# Patient Record
Sex: FEMALE | Race: WHITE | NOT HISPANIC OR LATINO | Employment: OTHER | URBAN - METROPOLITAN AREA
[De-identification: names, ages, dates, MRNs, and addresses within clinical notes are randomized per-mention and may not be internally consistent; named-entity substitution may affect disease eponyms.]

---

## 2023-11-22 ENCOUNTER — APPOINTMENT (OUTPATIENT)
Dept: PAIN MEDICINE | Facility: CLINIC | Age: 73
End: 2023-11-22
Payer: MEDICARE

## 2023-11-29 PROBLEM — L60.8 NAIL DISCOLORATION: Status: ACTIVE | Noted: 2023-11-29

## 2023-11-29 PROBLEM — N89.8 VAGINAL ITCHING: Status: ACTIVE | Noted: 2023-11-29

## 2023-11-29 PROBLEM — M43.10 ACQUIRED SPONDYLOLISTHESIS: Status: ACTIVE | Noted: 2023-11-29

## 2023-11-29 PROBLEM — R10.9 ABDOMINAL PAIN: Status: ACTIVE | Noted: 2023-11-29

## 2023-11-29 PROBLEM — M54.16 CHRONIC LUMBAR RADICULOPATHY: Status: ACTIVE | Noted: 2023-11-29

## 2023-11-29 PROBLEM — B35.1 ONYCHOMYCOSIS: Status: ACTIVE | Noted: 2023-11-29

## 2023-11-29 PROBLEM — L85.3 XEROSIS OF SKIN: Status: ACTIVE | Noted: 2023-11-29

## 2023-11-29 PROBLEM — R60.0 EDEMA LEG: Status: ACTIVE | Noted: 2023-11-29

## 2023-11-29 PROBLEM — R10.2 FEMALE PELVIC PAIN: Status: ACTIVE | Noted: 2023-11-29

## 2023-11-29 PROBLEM — M76.70 PERONEAL TENDONITIS: Status: ACTIVE | Noted: 2023-11-29

## 2023-11-29 PROBLEM — E87.1 HYPONATREMIA: Status: ACTIVE | Noted: 2023-11-29

## 2023-11-29 PROBLEM — J32.9 SINUSITIS: Status: ACTIVE | Noted: 2023-11-29

## 2023-11-29 PROBLEM — G25.81 RESTLESS LEG SYNDROME: Status: ACTIVE | Noted: 2023-11-29

## 2023-11-29 PROBLEM — K21.9 GERD (GASTROESOPHAGEAL REFLUX DISEASE): Status: ACTIVE | Noted: 2023-11-29

## 2023-11-29 PROBLEM — M79.673 FOOT PAIN: Status: ACTIVE | Noted: 2023-11-29

## 2023-11-29 PROBLEM — E55.9 VITAMIN D DEFICIENCY, UNSPECIFIED: Status: ACTIVE | Noted: 2023-11-29

## 2023-11-29 PROBLEM — N39.0 ACUTE UTI: Status: ACTIVE | Noted: 2023-11-29

## 2023-11-29 PROBLEM — I10 HYPERTENSION: Status: ACTIVE | Noted: 2023-11-29

## 2023-11-29 PROBLEM — E78.5 DYSLIPIDEMIA: Status: ACTIVE | Noted: 2023-11-29

## 2023-11-29 PROBLEM — J02.9 PHARYNGITIS: Status: ACTIVE | Noted: 2023-11-29

## 2023-11-29 PROBLEM — R30.0 DYSURIA: Status: ACTIVE | Noted: 2023-11-29

## 2023-11-29 PROBLEM — L60.3 DYSTROPHIC NAIL: Status: ACTIVE | Noted: 2023-11-29

## 2023-11-29 PROBLEM — M54.50 LUMBAR SPINE PAIN: Status: ACTIVE | Noted: 2023-11-29

## 2023-11-29 RX ORDER — SIMVASTATIN 10 MG/1
1 TABLET, FILM COATED ORAL EVERY EVENING
COMMUNITY
Start: 2019-01-08

## 2023-11-29 RX ORDER — OMEPRAZOLE 40 MG/1
20 CAPSULE, DELAYED RELEASE ORAL DAILY
COMMUNITY
Start: 2018-08-14 | End: 2024-01-29 | Stop reason: ALTCHOICE

## 2023-11-29 RX ORDER — HYDROCHLOROTHIAZIDE 12.5 MG/1
1 TABLET ORAL
COMMUNITY
Start: 2017-08-31 | End: 2023-11-30 | Stop reason: ALTCHOICE

## 2023-11-29 RX ORDER — ATENOLOL 50 MG/1
1 TABLET ORAL 2 TIMES DAILY
COMMUNITY
Start: 2017-08-31

## 2023-11-29 RX ORDER — ACETAMINOPHEN 500 MG
TABLET ORAL
COMMUNITY
End: 2024-01-29 | Stop reason: ALTCHOICE

## 2023-11-29 RX ORDER — LISINOPRIL 40 MG/1
1 TABLET ORAL DAILY
COMMUNITY
Start: 2017-08-31

## 2023-11-29 RX ORDER — CHOLECALCIFEROL (VITAMIN D3) 25 MCG
1000 TABLET ORAL EVERY OTHER DAY
COMMUNITY
End: 2023-11-30 | Stop reason: ALTCHOICE

## 2023-11-29 RX ORDER — LANOLIN ALCOHOL/MO/W.PET/CERES
1 CREAM (GRAM) TOPICAL DAILY
COMMUNITY
End: 2024-01-29 | Stop reason: ALTCHOICE

## 2023-11-30 ENCOUNTER — OFFICE VISIT (OUTPATIENT)
Dept: PAIN MEDICINE | Facility: CLINIC | Age: 73
End: 2023-11-30
Payer: MEDICARE

## 2023-11-30 VITALS — TEMPERATURE: 97.9 F | HEART RATE: 67 BPM | HEIGHT: 67 IN | BODY MASS INDEX: 29.82 KG/M2 | WEIGHT: 190 LBS

## 2023-11-30 DIAGNOSIS — M54.50 LUMBAR SPINE PAIN: ICD-10-CM

## 2023-11-30 DIAGNOSIS — R29.898 RIGHT LEG WEAKNESS: ICD-10-CM

## 2023-11-30 DIAGNOSIS — M43.10 ACQUIRED SPONDYLOLISTHESIS: ICD-10-CM

## 2023-11-30 DIAGNOSIS — M54.16 CHRONIC LUMBAR RADICULOPATHY: Primary | ICD-10-CM

## 2023-11-30 PROCEDURE — 99214 OFFICE O/P EST MOD 30 MIN: CPT | Performed by: ANESTHESIOLOGY

## 2023-11-30 RX ORDER — DOXAZOSIN 1 MG/1
2 TABLET ORAL NIGHTLY
COMMUNITY

## 2023-11-30 SDOH — ECONOMIC STABILITY: FOOD INSECURITY: WITHIN THE PAST 12 MONTHS, YOU WORRIED THAT YOUR FOOD WOULD RUN OUT BEFORE YOU GOT MONEY TO BUY MORE.: NEVER TRUE

## 2023-11-30 SDOH — ECONOMIC STABILITY: FOOD INSECURITY: WITHIN THE PAST 12 MONTHS, THE FOOD YOU BOUGHT JUST DIDN'T LAST AND YOU DIDN'T HAVE MONEY TO GET MORE.: NEVER TRUE

## 2023-11-30 ASSESSMENT — PATIENT HEALTH QUESTIONNAIRE - PHQ9
2. FEELING DOWN, DEPRESSED OR HOPELESS: NOT AT ALL
SUM OF ALL RESPONSES TO PHQ9 QUESTIONS 1 AND 2: 0
1. LITTLE INTEREST OR PLEASURE IN DOING THINGS: NOT AT ALL

## 2023-11-30 ASSESSMENT — ENCOUNTER SYMPTOMS
DIARRHEA: 0
CONSTIPATION: 0
SHORTNESS OF BREATH: 0
NAUSEA: 0
NUMBNESS: 1
ARTHRALGIAS: 1
DIAPHORESIS: 0
BACK PAIN: 1
SPEECH DIFFICULTY: 0
WEAKNESS: 0
COUGH: 0
EYE DISCHARGE: 0
DIFFICULTY URINATING: 0
NECK STIFFNESS: 0
DIZZINESS: 0
VOMITING: 0
SEIZURES: 0
WHEEZING: 0
CHEST TIGHTNESS: 0
NECK PAIN: 0
BLOOD IN STOOL: 0
CHILLS: 0
FEVER: 0

## 2023-11-30 ASSESSMENT — LIFESTYLE VARIABLES
HOW MANY STANDARD DRINKS CONTAINING ALCOHOL DO YOU HAVE ON A TYPICAL DAY: PATIENT DOES NOT DRINK
SKIP TO QUESTIONS 9-10: 1
HOW OFTEN DO YOU HAVE SIX OR MORE DRINKS ON ONE OCCASION: NEVER
HOW OFTEN DO YOU HAVE A DRINK CONTAINING ALCOHOL: NEVER
AUDIT-C TOTAL SCORE: 0

## 2023-11-30 ASSESSMENT — COLUMBIA-SUICIDE SEVERITY RATING SCALE - C-SSRS
2. HAVE YOU ACTUALLY HAD ANY THOUGHTS OF KILLING YOURSELF?: NO
1. IN THE PAST MONTH, HAVE YOU WISHED YOU WERE DEAD OR WISHED YOU COULD GO TO SLEEP AND NOT WAKE UP?: NO
6. HAVE YOU EVER DONE ANYTHING, STARTED TO DO ANYTHING, OR PREPARED TO DO ANYTHING TO END YOUR LIFE?: NO

## 2023-11-30 NOTE — PROGRESS NOTES
Low back pain in the right sciatic and hip and down the leg.  History of right hip and knee pain.  Denies back and neck surgery

## 2023-11-30 NOTE — PROGRESS NOTES
History Of Present Illness  Juliana Dior is a 73 y.o. female presenting with   Chief Complaint   Patient presents with    Back Pain       Patient presents with complaints of chronic low back pain to the RIGHT HIP AND BUTTOCK anterolateral LE. The pain is constant, worse with activity and better with rest. The pain is sharp, stabbing and shooting to the RLE POSTERIORLY. Denies LE paresthesias, weakness, saddle anesthesia, bowel or bladder incontinence. To manage this pain the patient has attempted Tylenol, Aleve with some relief. The patient has undergone lumbar injections with [place]. Hobbs [place] [facility] [place] set of 4 injections. The patients chronic HTN are stable on medication management.     PAIN SCORE: 8/10.    Dr. Hobbs Patient, pt's daughter used to manage [facility]    PCP: Dr. David WHITNEY        Past Medical History  She has a past medical history of Encounter for screening for malignant neoplasm of vagina, Essential (primary) hypertension (07/31/2020), Hypo-osmolality and hyponatremia, Other specified postprocedural states, Personal history of other diseases of the digestive system, Personal history of other endocrine, nutritional and metabolic disease (07/12/2017), Personal history of other medical treatment, Personal history of other medical treatment, Personal history of other medical treatment, and Personal history of other medical treatment.    Surgical History  She has a past surgical history that includes Cataract extraction (02/24/2015); Hysterectomy (02/24/2015); Eye surgery (02/24/2015); Other surgical history (02/24/2015); Breast biopsy (12/18/2017); Appendectomy (12/18/2017); and XR knee (Right, 04/30/2021).     Social History  She reports that she has never smoked. She has never used smokeless tobacco. She reports that she does not drink alcohol and does not use drugs.    Family History  No family history on file.     Allergies  Patient has no known allergies.    Review of Systems    Constitutional:  Negative for chills, diaphoresis and fever.   HENT:  Negative for ear discharge and tinnitus.    Eyes:  Negative for discharge.   Respiratory:  Negative for cough, chest tightness, shortness of breath and wheezing.    Cardiovascular:  Negative for chest pain.   Gastrointestinal:  Negative for blood in stool, constipation, diarrhea, nausea and vomiting.   Endocrine: Negative for polyuria.   Genitourinary:  Negative for difficulty urinating.   Musculoskeletal:  Positive for arthralgias, back pain and gait problem. Negative for neck pain and neck stiffness.   Skin:  Negative for rash.   Neurological:  Positive for numbness. Negative for dizziness, seizures, speech difficulty and weakness.       All other systems reviewed and negative for any deficits. Pertinent positives and negatives were considered in the medical decision making process.        Physical Exam  Pulse 67   Temp 36.6 °C (97.9 °F)   Wt 86.2 kg (190 lb)     General: Pt appears stated age    Eyes: Conjunctiva non-icteric and lids without obvious rash or drooping. Pupils are symmetric    ENT: External ears and nose appear to be without deformity or rash. No lesions or masses noted. Hearing is grossly intact    Neck: No JVD noted, tracheal position midline. No goiter noted on assessment of thyroid    Respiratory: No gasping or shortness of breath noted, no use of accessory muscles noted    Cardiovascular: Extremities show no edema or varicosities    Skin: No rashes or open lesions/ulcers identified on skin. No induration/tightening noted with palpation of skin    Musculoskeletal: Gait is antalgic     Digits/nails show no clubbing or cyanosis    Exam of muscles/joints/bones shows no gross atrophy and no abnormal/involuntary movements in the head/neckNo asymmetry or masses noted in the head/neck    Stability: no subluxation noted on movement of bilateral upper extremities or head/neck    Strength: 3/5 in RLE and 5/5 LLE     Range of Motion:  "WNL     Sensation: DEC TO SHARP TOUCH ALONG L5 DERMATOME    Cranial nerves 2-12 are grossly intact    Psychiatric: Pt is alert and oriented to time, place and person.         Assessment/Plan   1. Chronic lumbar radiculopathy        2. Lumbar spine pain        3. Acquired spondylolisthesis               I have provided the patient with a list of physical therapy exercises to learn and perform to strengthen core, maintain stabilization, and reduce pain. We reviewed the exercises in detail and I encouraged them to perform them on a regular basis.    The pt has failed PT x 6 weeks at [facility] and has failed over 6 weeks of Med management with Aleve and Tylenol. Given her exam findings I would recommend a new MRI of her lumbar spine.     2. I extensively reviewed the patients x-ray findings (2023) in detail, including review of the actual images and provided a detailed explanation of the findings using a spine model.     \"08/13/2023 8:57 PM EDT   Imaging Result:    X-rays obtained AP lateral x-rays lumbar spine show evidence of grade 1  spondylolisthesis L5-S1 with degenerative changes.  No evidence of any hip  DJD. \"    [facility]  3.  The patient is a candidate for an LESI L5/S1 to treat low back and radicular pain. I spent time with the patient discussing all of the risks, benefits, and alternatives to this measure. Including but not limited to spinal infection, epidural hematoma/abscess, paralysis, nerve injury, steroid effects, and spinal headache. The patient understands and agrees to proceed as needed.     4. I would recommend the pt start on Gabapentin to help with nerve related pain. We discussed the risks, benefits, and side effects to this medication including the mechanism of action and the pt understands and will hold off for now.         Nirav Morgan MD    I spent time with the patient reviewing their imaging and discussing the risks benefits and alternatives to the above plan. A total of 45 minutes was spent " reviewing the data and greater than 50% of that time was with the patient during the face to face encounter discussing treatment options both surgical, non-surgical, and minimally invasive techniques.

## 2023-12-09 ENCOUNTER — LAB REQUISITION (OUTPATIENT)
Dept: LAB | Facility: HOSPITAL | Age: 73
End: 2023-12-09
Payer: MEDICARE

## 2023-12-09 DIAGNOSIS — R39.15 URGENCY OF URINATION: ICD-10-CM

## 2023-12-09 PROCEDURE — 87086 URINE CULTURE/COLONY COUNT: CPT

## 2023-12-11 LAB — BACTERIA UR CULT: NORMAL

## 2024-01-04 PROBLEM — H91.90 HOH (HARD OF HEARING): Status: ACTIVE | Noted: 2024-01-04

## 2024-01-04 PROBLEM — H61.23 IMPACTED CERUMEN, BILATERAL: Status: ACTIVE | Noted: 2023-11-02

## 2024-01-04 PROBLEM — G54.1 LUMBOSACRAL PLEXUS LESION: Status: ACTIVE | Noted: 2023-06-01

## 2024-01-04 PROBLEM — M47.26 OSTEOARTHRITIS OF SPINE WITH RADICULOPATHY, LUMBAR REGION: Status: ACTIVE | Noted: 2023-08-13

## 2024-01-04 PROBLEM — G61.9 INFLAMMATORY NEUROPATHY (MULTI): Status: ACTIVE | Noted: 2023-10-09

## 2024-01-04 PROBLEM — E78.5 HYPERLIPIDEMIA: Status: ACTIVE | Noted: 2020-06-08

## 2024-01-04 PROBLEM — R00.2 PALPITATIONS: Status: ACTIVE | Noted: 2020-06-09

## 2024-01-04 PROBLEM — E78.00 PURE HYPERCHOLESTEROLEMIA: Status: ACTIVE | Noted: 2020-06-09

## 2024-01-04 PROBLEM — R06.02 SOB (SHORTNESS OF BREATH): Status: ACTIVE | Noted: 2020-06-09

## 2024-01-04 PROBLEM — K21.9 GASTROESOPHAGEAL REFLUX DISEASE: Status: ACTIVE | Noted: 2020-06-08

## 2024-01-04 PROBLEM — I10 ESSENTIAL HYPERTENSION: Status: ACTIVE | Noted: 2020-06-08

## 2024-01-04 PROBLEM — M43.17 SPONDYLOLISTHESIS AT L5-S1 LEVEL: Status: ACTIVE | Noted: 2023-08-13

## 2024-01-04 RX ORDER — GENTAMICIN SULFATE 1 MG/G
OINTMENT TOPICAL
COMMUNITY
Start: 2023-10-04 | End: 2024-01-29 | Stop reason: ALTCHOICE

## 2024-01-04 RX ORDER — DOXAZOSIN 1 MG/1
TABLET ORAL
COMMUNITY
Start: 2021-04-12 | End: 2024-01-29 | Stop reason: ALTCHOICE

## 2024-01-04 RX ORDER — AMLODIPINE BESYLATE 5 MG/1
5 TABLET ORAL
COMMUNITY
Start: 2021-08-26 | End: 2024-01-29 | Stop reason: ALTCHOICE

## 2024-01-04 RX ORDER — CALCIUM CARBONATE 600 MG
TABLET ORAL
COMMUNITY
Start: 2021-04-12

## 2024-01-04 RX ORDER — OMEPRAZOLE 20 MG/1
CAPSULE, DELAYED RELEASE ORAL
COMMUNITY
Start: 2023-11-18

## 2024-01-04 RX ORDER — CALCIUM CARBONATE 260MG(650)
100 TABLET,CHEWABLE ORAL
COMMUNITY
Start: 2020-06-09

## 2024-01-04 RX ORDER — MULTIVITAMIN
TABLET ORAL
COMMUNITY
End: 2024-01-29 | Stop reason: ALTCHOICE

## 2024-01-04 RX ORDER — LOVASTATIN 10 MG/1
10 TABLET ORAL
COMMUNITY
End: 2024-01-29 | Stop reason: ALTCHOICE

## 2024-01-04 RX ORDER — METHYLPREDNISOLONE 4 MG/1
TABLET ORAL
COMMUNITY
Start: 2023-08-10 | End: 2024-01-29 | Stop reason: ALTCHOICE

## 2024-01-04 RX ORDER — METHOCARBAMOL 750 MG/1
TABLET, FILM COATED ORAL
COMMUNITY
Start: 2023-08-10 | End: 2024-01-29 | Stop reason: ALTCHOICE

## 2024-01-04 RX ORDER — FLUTICASONE PROPIONATE 50 UG/1
SPRAY, METERED NASAL
COMMUNITY
Start: 2021-04-12

## 2024-01-04 RX ORDER — CALCIUM CARBONATE 500(1250)
1250 TABLET ORAL 2 TIMES DAILY
COMMUNITY
Start: 2020-06-09 | End: 2024-01-29 | Stop reason: ALTCHOICE

## 2024-01-29 ENCOUNTER — OFFICE VISIT (OUTPATIENT)
Dept: PAIN MEDICINE | Facility: CLINIC | Age: 74
End: 2024-01-29
Payer: MEDICARE

## 2024-01-29 VITALS
WEIGHT: 190 LBS | SYSTOLIC BLOOD PRESSURE: 154 MMHG | BODY MASS INDEX: 29.76 KG/M2 | HEART RATE: 66 BPM | RESPIRATION RATE: 15 BRPM | DIASTOLIC BLOOD PRESSURE: 76 MMHG | TEMPERATURE: 95.7 F

## 2024-01-29 DIAGNOSIS — M54.16 LUMBAR NEURITIS: ICD-10-CM

## 2024-01-29 DIAGNOSIS — M43.17 SPONDYLOLISTHESIS AT L5-S1 LEVEL: Primary | ICD-10-CM

## 2024-01-29 DIAGNOSIS — M48.061 LUMBAR FORAMINAL STENOSIS: ICD-10-CM

## 2024-01-29 DIAGNOSIS — M54.50 LUMBAR SPINE PAIN: ICD-10-CM

## 2024-01-29 PROCEDURE — 99214 OFFICE O/P EST MOD 30 MIN: CPT | Performed by: ANESTHESIOLOGY

## 2024-01-29 RX ORDER — TROSPIUM CHLORIDE 20 MG/1
20 TABLET, FILM COATED ORAL 2 TIMES DAILY
COMMUNITY

## 2024-01-29 ASSESSMENT — ENCOUNTER SYMPTOMS
FEVER: 0
DIAPHORESIS: 0
BLOOD IN STOOL: 0
LOSS OF SENSATION IN FEET: 0
SEIZURES: 0
SHORTNESS OF BREATH: 0
DIZZINESS: 0
OCCASIONAL FEELINGS OF UNSTEADINESS: 0
WEAKNESS: 0
COUGH: 0
NAUSEA: 0
CONSTIPATION: 0
ARTHRALGIAS: 1
SPEECH DIFFICULTY: 0
NUMBNESS: 1
DIFFICULTY URINATING: 0
NECK PAIN: 0
CHILLS: 0
VOMITING: 0
BACK PAIN: 1
DIARRHEA: 0
DEPRESSION: 0
EYE DISCHARGE: 0
CHEST TIGHTNESS: 0
WHEEZING: 0
NECK STIFFNESS: 0

## 2024-01-29 ASSESSMENT — PAIN SCALES - GENERAL: PAINLEVEL: 5

## 2024-01-29 NOTE — PROGRESS NOTES
Right low back to the right hip to the back of the leg.  History of knee pain.  Denies back and neck surgery

## 2024-01-29 NOTE — PROGRESS NOTES
History Of Present Illness  Juliana Dior is a 73 y.o. female presenting with   Chief Complaint   Patient presents with    Back Pain       Patient follows up for slow return chronic low back pain to the RIGHT HIP AND BUTTOCK anterolateral LE. The pain is constant, worse with activity and better with rest. The pain is sharp, stabbing and shooting to the RLE POSTERIORLY. Denies LE paresthesias, weakness, saddle anesthesia, bowel or bladder incontinence. To manage this pain the patient has attempted Tylenol, Aleve with some relief. The patient has undergone lumbar injections with Dr. Hobbs at [facility] for a set of 4 injections. The patients chronic HTN are stable on medication management.     PAIN SCORE: 8/10.    Dr. Hobbs Patient, pt's daughter used to manage [facility] at [facility]    PCP: Dr. David WHITNEY        Past Medical History  She has a past medical history of Encounter for screening for malignant neoplasm of vagina, Essential (primary) hypertension (07/31/2020), Hypo-osmolality and hyponatremia, Other specified postprocedural states, Personal history of other diseases of the digestive system, Personal history of other endocrine, nutritional and metabolic disease (07/12/2017), Personal history of other medical treatment, Personal history of other medical treatment, Personal history of other medical treatment, and Personal history of other medical treatment.    Surgical History  She has a past surgical history that includes Cataract extraction (02/24/2015); Hysterectomy (02/24/2015); Eye surgery (02/24/2015); Other surgical history (02/24/2015); Breast biopsy (12/18/2017); Appendectomy (12/18/2017); and XR knee (Right, 04/30/2021).     Social History  She reports that she has never smoked. She has never used smokeless tobacco. She reports that she does not drink alcohol and does not use drugs.    Family History  No family history on file.     Allergies  Ibuprofen    Review of Systems   Constitutional:   Negative for chills, diaphoresis and fever.   HENT:  Negative for ear discharge and tinnitus.    Eyes:  Negative for discharge.   Respiratory:  Negative for cough, chest tightness, shortness of breath and wheezing.    Cardiovascular:  Negative for chest pain.   Gastrointestinal:  Negative for blood in stool, constipation, diarrhea, nausea and vomiting.   Endocrine: Negative for polyuria.   Genitourinary:  Negative for difficulty urinating.   Musculoskeletal:  Positive for arthralgias, back pain and gait problem. Negative for neck pain and neck stiffness.   Skin:  Negative for rash.   Neurological:  Positive for numbness. Negative for dizziness, seizures, speech difficulty and weakness.       All other systems reviewed and negative for any deficits. Pertinent positives and negatives were considered in the medical decision making process.        Physical Exam  /76   Pulse 66   Temp 35.4 °C (95.7 °F)   Resp 15   Wt 86.2 kg (190 lb)     General: Pt appears stated age    Eyes: Conjunctiva non-icteric and lids without obvious rash or drooping. Pupils are symmetric    ENT: External ears and nose appear to be without deformity or rash. No lesions or masses noted. Hearing is grossly intact    Neck: No JVD noted, tracheal position midline. No goiter noted on assessment of thyroid    Respiratory: No gasping or shortness of breath noted, no use of accessory muscles noted    Cardiovascular: Extremities show no edema or varicosities    Skin: No rashes or open lesions/ulcers identified on skin. No induration/tightening noted with palpation of skin    Musculoskeletal: Gait is antalgic     Digits/nails show no clubbing or cyanosis    Exam of muscles/joints/bones shows no gross atrophy and no abnormal/involuntary movements in the head/neckNo asymmetry or masses noted in the head/neck    Stability: no subluxation noted on movement of bilateral upper extremities or head/neck    Strength: 3/5 in RLE and 5/5 LLE     Range of  "Motion: WNL     Sensation: DEC TO SHARP TOUCH ALONG L5 DERMATOME    Cranial nerves 2-12 are grossly intact    Psychiatric: Pt is alert and oriented to time, place and person.         Assessment/Plan   1. Spondylolisthesis at L5-S1 level        2. Lumbar spine pain        3. Lumbar neuritis        4. Lumbar foraminal stenosis               I have provided the patient with a list of physical therapy exercises to learn and perform to strengthen core, maintain stabilization, and reduce pain. We reviewed the exercises in detail and I encouraged them to perform them on a regular basis.    The pt has failed PT x 6 weeks at [facility] and has failed over 6 weeks of Med management with Aleve and Tylenol. Given her exam findings I would recommend a new MRI of her lumbar spine.     2. I extensively reviewed the patients x-ray findings (2023) in detail, including review of the actual images and provided a detailed explanation of the findings using a spine model.     \"08/13/2023 8:57 PM EDT   Imaging Result:    X-rays obtained AP lateral x-rays lumbar spine show evidence of grade 1  spondylolisthesis L5-S1 with degenerative changes.  No evidence of any hip  DJD. \"    [facility]  3.  The patient is a candidate for an LESI L5/S1 to treat low back and radicular pain. I spent time with the patient discussing all of the risks, benefits, and alternatives to this measure. Including but not limited to spinal infection, epidural hematoma/abscess, paralysis, nerve injury, steroid effects, and spinal headache. The patient understands and agrees to proceed as needed.     Her last injection was on 12- and she reported 80% relief of her pain lasting for approx 1 months time. She was able to sleep for the first time since her pain started. Pt reports it has slowly returned and getting comfortable has become more painful for her.     4. I would recommend the pt start on Gabapentin to help with nerve related pain. We discussed the risks, benefits, " and side effects to this medication including the mechanism of action and the pt understands and will hold off for now.         Nirav Morgan MD    I spent time with the patient reviewing their imaging and discussing the risks benefits and alternatives to the above plan. A total of 45 minutes was spent reviewing the data and greater than 50% of that time was with the patient during the face to face encounter discussing treatment options both surgical, non-surgical, and minimally invasive techniques.        Hydroxychloroquine Counseling:  I discussed with the patient that a baseline ophthalmologic exam is needed at the start of therapy and every year thereafter while on therapy. A CBC may also be warranted for monitoring.  The side effects of this medication were discussed with the patient, including but not limited to agranulocytosis, aplastic anemia, seizures, rashes, retinopathy, and liver toxicity. Patient instructed to call the office should any adverse effect occur.  The patient verbalized understanding of the proper use and possible adverse effects of Plaquenil.  All the patient's questions and concerns were addressed.

## 2024-07-22 ENCOUNTER — TELEPHONE (OUTPATIENT)
Dept: PAIN MEDICINE | Facility: CLINIC | Age: 74
End: 2024-07-22
Payer: MEDICARE

## 2024-08-01 DIAGNOSIS — M54.16 LUMBAR NEURITIS: Primary | ICD-10-CM

## 2024-08-02 ENCOUNTER — APPOINTMENT (OUTPATIENT)
Dept: PAIN MEDICINE | Facility: CLINIC | Age: 74
End: 2024-08-02
Payer: MEDICARE

## 2025-02-06 ENCOUNTER — CLINICAL SUPPORT (OUTPATIENT)
Dept: URGENT CARE | Age: 75
End: 2025-02-06
Payer: MEDICARE

## 2025-02-06 VITALS
OXYGEN SATURATION: 99 % | DIASTOLIC BLOOD PRESSURE: 94 MMHG | HEART RATE: 66 BPM | SYSTOLIC BLOOD PRESSURE: 182 MMHG | RESPIRATION RATE: 15 BRPM | TEMPERATURE: 98.2 F

## 2025-02-06 DIAGNOSIS — T14.8XXA MUSCLE STRAIN: Primary | ICD-10-CM

## 2025-02-06 RX ORDER — METHYLPREDNISOLONE 4 MG/1
TABLET ORAL
Qty: 21 TABLET | Refills: 0 | Status: SHIPPED | OUTPATIENT
Start: 2025-02-06 | End: 2025-02-12

## 2025-02-06 RX ORDER — TIZANIDINE HYDROCHLORIDE 2 MG/1
2 CAPSULE, GELATIN COATED ORAL EVERY 8 HOURS PRN
Qty: 9 CAPSULE | Refills: 0 | Status: SHIPPED | OUTPATIENT
Start: 2025-02-06 | End: 2025-02-09

## 2025-02-06 ASSESSMENT — ENCOUNTER SYMPTOMS: MYALGIAS: 1

## 2025-02-06 NOTE — PROGRESS NOTES
Subjective   Patient ID: Juliana Dior is a 75 y.o. female. They present today with a chief complaint of Other (Pain under left shoulder blade X 2 days. JOE-MA).    History of Present Illness  HPI    Patient presents to the urgent care for a chief complaint of left-sided shoulder blade pain over the last 2 days.  Patient has used over-the-counter Salonpas patch which did seem to help, she denies any chest pain or shortness of breath.  Patient states that approximately 2 weeks ago did participate in a senior activities class did have some soreness in's similar area which did resolve, patient states yesterday while bowling developed left thoracic shoulder blade pain.  No report of any significant injury or specific inciting event patient does have a little full range of motion of shoulder  Past Medical History  Allergies as of 02/06/2025 - Reviewed 02/06/2025   Allergen Reaction Noted    Ibuprofen Other 03/17/2016       (Not in a hospital admission)       Past Medical History:   Diagnosis Date    Encounter for screening for malignant neoplasm of vagina     Vaginal Pap smear    Essential (primary) hypertension 07/31/2020    Hypertension    Hypo-osmolality and hyponatremia     Hyponatremia    Other specified postprocedural states     H/O colonoscopy    Personal history of other diseases of the digestive system     History of gastroesophageal reflux (GERD)    Personal history of other endocrine, nutritional and metabolic disease 07/12/2017    History of hyperlipidemia    Personal history of other medical treatment     H/O diagnostic mammography    Personal history of other medical treatment     History of bone density study    Personal history of other medical treatment     H/O diagnostic ultrasound    Personal history of other medical treatment     H/O mammogram       Past Surgical History:   Procedure Laterality Date    APPENDECTOMY  12/18/2017    Appendectomy    BREAST BIOPSY  12/18/2017    Biopsy Breast Open     CATARACT EXTRACTION  02/24/2015    Cataract Surgery    EYE SURGERY  02/24/2015    Eye Surgery    HYSTERECTOMY  02/24/2015    Hysterectomy    KNEE Right 04/30/2021    replaced    OTHER SURGICAL HISTORY  02/24/2015    Vaginal Sling Operation For Stress Incontinence        reports that she has never smoked. She has never used smokeless tobacco. She reports that she does not drink alcohol and does not use drugs.    Review of Systems  Review of Systems   Musculoskeletal:  Positive for myalgias.   All other systems reviewed and are negative.                                 Objective    Vitals:    02/06/25 0925   BP: (!) 182/94   Pulse: 66   Resp: 15   Temp: 36.8 °C (98.2 °F)   SpO2: 99%     No LMP recorded.    Physical Exam  Vitals and nursing note reviewed.   Constitutional:       General: She is not in acute distress.     Appearance: Normal appearance. She is not ill-appearing, toxic-appearing or diaphoretic.   Cardiovascular:      Rate and Rhythm: Normal rate.      Comments: Patient endorses whitecoat syndrome in regards to elevated BP  Pulmonary:      Effort: Pulmonary effort is normal. No respiratory distress.   Musculoskeletal:         General: Tenderness present. No swelling. Normal range of motion.      Comments: Upon examination of left upper back and shoulder no presence of gross deformity ecchymosis or edema, no pain with palpation over cervical thoracic spinous processes.,  Able to produce pain with palpation of left lateral paraspinal muscles of the thoracic region, range of motion of left shoulder is full, able to flex extend adduct and abduct, able to flex extend at elbow able to splay out fingers and make a fist.  Negative drop arm, negative Contreras negative empty cans   Skin:     Findings: No bruising or rash.   Neurological:      General: No focal deficit present.      Mental Status: She is alert and oriented to person, place, and time.   Psychiatric:         Mood and Affect: Mood normal.          Behavior: Behavior normal.         Procedures    Point of Care Test & Imaging Results from this visit  No results found for this visit on 02/06/25.   No results found.    Diagnostic study results (if any) were reviewed by [facility].    Assessment/Plan   Allergies, medications, history, and pertinent labs/EKGs/Imaging reviewed by Frederick Burgos PA-C.     Medical Decision Making  As able to reproduce pain with palpation and physical movements as was noted while patient was also putting on coat, no complaint of shortness of breath or chest pain I did discuss with patient I believe she has a thoracic paraspinal muscle strain patient will be placed on Medrol Dosepak May continue Salonpas, I did discuss with patient avoiding NSAIDs such as ibuprofen Advil Aleve or Motrin may take Tylenol.  Will also place patient on 2 mg 3-day course of tizanidine to help with symptoms.  May follow-up with primary care or orthopedics if no resolution regression of symptoms.  Patient verbalized understanding is agreeable to plan discharge emergent care A+O x 4 stable condition no signs of distress neurovascular intact    Orders and Diagnoses  Diagnoses and all orders for this visit:  Muscle strain  -     methylPREDNISolone (Medrol Dospak) 4 mg tablets; Take as directed on package.  -     tiZANidine (Zanaflex) 2 mg capsule; Take 1 capsule (2 mg) by mouth every 8 hours if needed for muscle spasms for up to 3 days.      Medical Admin Record      Patient disposition: Home    Electronically signed by [facility]  9:40 AM

## 2025-03-14 ENCOUNTER — TELEPHONE (OUTPATIENT)
Dept: PAIN MEDICINE | Facility: CLINIC | Age: 75
End: 2025-03-14
Payer: MEDICARE

## 2025-03-14 NOTE — TELEPHONE ENCOUNTER
Returned phone call.  Patient LVM requesting FUV with Dr. Morgan.  Left phone number for patient to call back so we can get her scheduled.

## 2025-04-10 ENCOUNTER — OFFICE VISIT (OUTPATIENT)
Dept: PAIN MEDICINE | Facility: CLINIC | Age: 75
End: 2025-04-10
Payer: MEDICARE

## 2025-04-10 VITALS
TEMPERATURE: 97.9 F | RESPIRATION RATE: 15 BRPM | WEIGHT: 190 LBS | HEART RATE: 66 BPM | BODY MASS INDEX: 29.82 KG/M2 | HEIGHT: 67 IN | OXYGEN SATURATION: 99 %

## 2025-04-10 DIAGNOSIS — M54.50 LUMBAR SPINE PAIN: Primary | ICD-10-CM

## 2025-04-10 DIAGNOSIS — M51.26 LUMBAR DISC HERNIATION: ICD-10-CM

## 2025-04-10 DIAGNOSIS — M54.16 LUMBAR NEURITIS: ICD-10-CM

## 2025-04-10 PROCEDURE — 99214 OFFICE O/P EST MOD 30 MIN: CPT | Performed by: ANESTHESIOLOGY

## 2025-04-10 RX ORDER — GABAPENTIN 300 MG/1
300 CAPSULE ORAL 2 TIMES DAILY
Qty: 180 CAPSULE | Refills: 3 | Status: SHIPPED | OUTPATIENT
Start: 2025-04-10 | End: 2025-07-09

## 2025-04-10 ASSESSMENT — ENCOUNTER SYMPTOMS
WHEEZING: 0
NECK PAIN: 0
VOMITING: 0
CONSTIPATION: 0
BLOOD IN STOOL: 0
NECK STIFFNESS: 0
WEAKNESS: 0
NAUSEA: 0
EYE DISCHARGE: 0
SHORTNESS OF BREATH: 0
DIFFICULTY URINATING: 0
SPEECH DIFFICULTY: 0
CHILLS: 0
DIZZINESS: 0
ARTHRALGIAS: 1
FEVER: 0
DIAPHORESIS: 0
LOSS OF SENSATION IN FEET: 0
DIARRHEA: 0
NUMBNESS: 1
SEIZURES: 0
BACK PAIN: 1
OCCASIONAL FEELINGS OF UNSTEADINESS: 0
COUGH: 0
CHEST TIGHTNESS: 0

## 2025-04-10 ASSESSMENT — PAIN DESCRIPTION - DESCRIPTORS: DESCRIPTORS: THROBBING

## 2025-04-10 ASSESSMENT — COLUMBIA-SUICIDE SEVERITY RATING SCALE - C-SSRS
6. HAVE YOU EVER DONE ANYTHING, STARTED TO DO ANYTHING, OR PREPARED TO DO ANYTHING TO END YOUR LIFE?: NO
2. HAVE YOU ACTUALLY HAD ANY THOUGHTS OF KILLING YOURSELF?: NO
1. IN THE PAST MONTH, HAVE YOU WISHED YOU WERE DEAD OR WISHED YOU COULD GO TO SLEEP AND NOT WAKE UP?: NO

## 2025-04-10 ASSESSMENT — PATIENT HEALTH QUESTIONNAIRE - PHQ9
2. FEELING DOWN, DEPRESSED OR HOPELESS: NOT AT ALL
1. LITTLE INTEREST OR PLEASURE IN DOING THINGS: NOT AT ALL
SUM OF ALL RESPONSES TO PHQ9 QUESTIONS 1 AND 2: 0

## 2025-04-10 ASSESSMENT — PAIN SCALES - GENERAL
PAINLEVEL_OUTOF10: 3
PAINLEVEL_OUTOF10: 3

## 2025-04-10 ASSESSMENT — PAIN - FUNCTIONAL ASSESSMENT: PAIN_FUNCTIONAL_ASSESSMENT: 0-10

## 2025-04-10 NOTE — PROGRESS NOTES
History Of Present Illness  Juliana Dior is a 75 y.o. female presenting with   Chief Complaint   Patient presents with    Follow-up       Patient follows up for slow return chronic low back pain to the RIGHT HIP AND BUTTOCK anterolateral LE. The pain is constant, worse with activity and better with rest. The pain is sharp, stabbing and shooting to the RLE POSTERIORLY. Denies LE paresthesias, weakness, saddle anesthesia, bowel or bladder incontinence. To manage this pain the patient has attempted Tylenol, Aleve with some relief. The patient has undergone lumbar injections with Dr. Hobbs at [facility] [place] for a set of 4 injections. The patients chronic HTN are stable on medication management.     PAIN SCORE: 8/10.    Dr. Hobbs Patient, pt's daughter used to manage [facility] [place] at [facility]    PCP: Dr. David WHITNEY        Past Medical History  She has a past medical history of Encounter for screening for malignant neoplasm of vagina, Essential (primary) hypertension (07/31/2020), Hypo-osmolality and hyponatremia, Other specified postprocedural states, Personal history of other diseases of the digestive system, Personal history of other endocrine, nutritional and metabolic disease (07/12/2017), Personal history of other medical treatment, Personal history of other medical treatment, Personal history of other medical treatment, and Personal history of other medical treatment.    Surgical History  She has a past surgical history that includes Cataract extraction (02/24/2015); Hysterectomy (02/24/2015); Eye surgery (02/24/2015); Other surgical history (02/24/2015); Breast biopsy (12/18/2017); Appendectomy (12/18/2017); and XR knee (Right, 04/30/2021).     Social History  She reports that she has never smoked. She has never used smokeless tobacco. She reports that she does not drink alcohol and does not use drugs.    Family History  No family history on file.     Allergies  Ibuprofen    Review of Systems   Constitutional:   Negative for chills, diaphoresis and fever.   HENT:  Negative for ear discharge and tinnitus.    Eyes:  Negative for discharge.   Respiratory:  Negative for cough, chest tightness, shortness of breath and wheezing.    Cardiovascular:  Negative for chest pain.   Gastrointestinal:  Negative for blood in stool, constipation, diarrhea, nausea and vomiting.   Endocrine: Negative for polyuria.   Genitourinary:  Negative for difficulty urinating.   Musculoskeletal:  Positive for arthralgias, back pain and gait problem. Negative for neck pain and neck stiffness.   Skin:  Negative for rash.   Neurological:  Positive for numbness. Negative for dizziness, seizures, speech difficulty and weakness.       All other systems reviewed and negative for any deficits. Pertinent positives and negatives were considered in the medical decision making process.        Physical Exam  Pulse 66   Temp 36.6 °C (97.9 °F)   Resp 15   Wt 86.2 kg (190 lb)   SpO2 99%     General: Pt appears stated age    Eyes: Conjunctiva non-icteric and lids without obvious rash or drooping. Pupils are symmetric    ENT: External ears and nose appear to be without deformity or rash. No lesions or masses noted. Hearing is grossly intact    Neck: No JVD noted, tracheal position midline. No goiter noted on assessment of thyroid    Respiratory: No gasping or shortness of breath noted, no use of accessory muscles noted    Cardiovascular: Extremities show no edema or varicosities    Skin: No rashes or open lesions/ulcers identified on skin. No induration/tightening noted with palpation of skin    Musculoskeletal: Gait is antalgic     Digits/nails show no clubbing or cyanosis    Exam of muscles/joints/bones shows no gross atrophy and no abnormal/involuntary movements in the head/neckNo asymmetry or masses noted in the head/neck    Stability: no subluxation noted on movement of bilateral upper extremities or head/neck    Strength: 3/5 in RLE and 5/5 LLE     Range of  "Motion: WNL     Sensation: DEC TO SHARP TOUCH ALONG L5 DERMATOME    Cranial nerves 2-12 are grossly intact    Psychiatric: Pt is alert and oriented to time, place and person.         Assessment/Plan   No diagnosis found.         I have provided the patient with a list of physical therapy exercises to learn and perform to strengthen core, maintain stabilization, and reduce pain. We reviewed the exercises in detail and I encouraged them to perform them on a regular basis.    The pt has failed PT x 6 weeks at [facility] and has failed over 6 weeks of Med management with Aleve and Tylenol. Given her exam findings I would recommend a new MRI of her lumbar spine.     2. I extensively reviewed the patients x-ray findings (2023) in detail, including review of the actual images and provided a detailed explanation of the findings using a spine model.     \"08/13/2023 8:57 PM EDT   Imaging Result:    X-rays obtained AP lateral x-rays lumbar spine show evidence of grade 1  spondylolisthesis L5-S1 with degenerative changes.  No evidence of any hip  DJD. \"    [facility] [place].  The patient is a candidate for an LESI L5/S1 to treat low back and radicular pain. I spent time with the patient discussing all of the risks, benefits, and alternatives to this measure. Including but not limited to spinal infection, epidural hematoma/abscess, paralysis, nerve injury, steroid effects, and spinal headache. The patient understands and agrees to proceed as needed.     Her last injection was on 12- and she reported 80% relief of her pain lasting for approx 1 months time. She was able to sleep for the first time since her pain started. Pt reports it has slowly returned and getting comfortable has become more painful for her.     4. I would recommend the pt start on Gabapentin to help with nerve related pain. We discussed the risks, benefits, and side effects to this medication including the mechanism of action and the pt understands and will start at " this time.         Nirav Morgan MD    I spent time with the patient reviewing their imaging and discussing the risks benefits and alternatives to the above plan. A total of 30 minutes was spent reviewing the data and greater than 50% of that time was with the patient during the face to face encounter discussing treatment options both surgical, non-surgical, and minimally invasive techniques.

## 2025-04-10 NOTE — PROGRESS NOTES
Low back pain and left leg throbbing in the pm,  Denies hip and knee pain.  Denies back and neck surgery

## 2025-04-28 ENCOUNTER — TELEPHONE (OUTPATIENT)
Dept: PAIN MEDICINE | Facility: CLINIC | Age: 75
End: 2025-04-28
Payer: MEDICARE

## 2025-04-28 NOTE — TELEPHONE ENCOUNTER
She was told to give you a call when she is ready  to schedule a procedure (at [facility]?).  Her last visit was 4/10/25.

## 2025-05-06 ENCOUNTER — TELEPHONE (OUTPATIENT)
Dept: PAIN MEDICINE | Facility: CLINIC | Age: 75
End: 2025-05-06

## 2025-05-06 NOTE — TELEPHONE ENCOUNTER
Juliana would like to schedule a injection for her back at [facility] said they don't schedule these.  Orders will need to be put in - Please advise on the proper scheduling for this.